# Patient Record
Sex: FEMALE | Employment: UNEMPLOYED | URBAN - METROPOLITAN AREA
[De-identification: names, ages, dates, MRNs, and addresses within clinical notes are randomized per-mention and may not be internally consistent; named-entity substitution may affect disease eponyms.]

---
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
## 2022-11-04 ENCOUNTER — OFFICE VISIT (OUTPATIENT)
Dept: OBGYN CLINIC | Facility: MEDICAL CENTER | Age: 22
End: 2022-11-04

## 2022-11-04 VITALS
WEIGHT: 293 LBS | DIASTOLIC BLOOD PRESSURE: 90 MMHG | HEIGHT: 64 IN | BODY MASS INDEX: 50.02 KG/M2 | SYSTOLIC BLOOD PRESSURE: 130 MMHG

## 2022-11-04 DIAGNOSIS — E66.01 CLASS 3 SEVERE OBESITY WITHOUT SERIOUS COMORBIDITY WITH BODY MASS INDEX (BMI) OF 50.0 TO 59.9 IN ADULT, UNSPECIFIED OBESITY TYPE (HCC): Primary | ICD-10-CM

## 2022-11-04 DIAGNOSIS — N92.6 IRREGULAR PERIODS: ICD-10-CM

## 2022-11-04 RX ORDER — NORETHINDRONE ACETATE AND ETHINYL ESTRADIOL 1MG-20(21)
1 KIT ORAL DAILY
Qty: 84 TABLET | Refills: 3 | Status: SHIPPED | OUTPATIENT
Start: 2022-11-04

## 2022-11-04 NOTE — PROGRESS NOTES
Assessment/Plan:      Diagnoses and all orders for this visit:    Class 3 severe obesity without serious comorbidity with body mass index (BMI) of 50 0 to 59 9 in adult, unspecified obesity type ([facility] )  -     Ambulatory Referral to Weight Management; Future  Recommend weight loss for healthier pregnancy and also may help regular cycles  Pt agreeable and willing to start OCP  Irregular periods  ? PCOS/anovulatory cycles - bleeding not cyclic  F/u labs - then will start OCP  Focus on weight loss if labs normal   -     TSH, 3rd generation with Free T4 reflex; Future  -     Prolactin; Future  -     Testosterone, free, total; Future  -     Follicle stimulating hormone; Future  -     Estradiol; Future  -     US pelvis complete w transvaginal; Future  -     DHEA-sulfate; Future  -     17-Hydroxyprogesterone; Future  -     norethindrone-ethinyl estradiol (JUNEL FE 1/20) 1-20 MG-MCG per tablet; Take 1 tablet by mouth daily    F/u in 3 months or prn  Other orders  -     Prenatal Vit-Fe Fumarate-FA (PRENATAL PO); Take by mouth          Subjective:     Patient ID: Kaitlin Wallace is a 23 y o  female  22 yo G0 presents to discuss irregular bleeding that often last longer than 7 days with the longest lasting 20 days  Cycles are on average 3 wks  Her bleeding is lighter than previously, not much cramping  Usually only has a week without a period before the next one comes  This started after stopping BC pill a few years ago  Currently sexually active, no form of birth control as they are trying to conceive since September   LMP: 10/6/22          Review of Systems   All other systems reviewed and are negative  Objective:     Physical Exam  Pulmonary:      Effort: Pulmonary effort is normal    Neurological:      Mental Status: She is alert and oriented to person, place, and time     Psychiatric:         Behavior: Behavior normal

## 2022-11-17 ENCOUNTER — HOSPITAL ENCOUNTER (OUTPATIENT)
Dept: ULTRASOUND IMAGING | Facility: HOSPITAL | Age: 22
End: 2022-11-17
Attending: STUDENT IN AN ORGANIZED HEALTH CARE EDUCATION/TRAINING PROGRAM

## 2022-11-17 DIAGNOSIS — N92.6 IRREGULAR PERIODS: ICD-10-CM

## 2022-11-25 ENCOUNTER — TELEPHONE (OUTPATIENT)
Dept: OBGYN CLINIC | Facility: CLINIC | Age: 22
End: 2022-11-25

## 2022-11-25 NOTE — TELEPHONE ENCOUNTER
----- Message from Horacio Lama MD sent at 11/25/2022 11:18 AM EST -----  Please notify US is normal - no obvious cause for change in periods  Should complete labs at her soonest convenience  Should hold off on starting OCP until labs complete  Please also schedule f/u with me in 2-3 months